# Patient Record
(demographics unavailable — no encounter records)

---

## 2025-04-14 NOTE — HISTORY OF PRESENT ILLNESS
[FreeTextEntry1] : here for annual menopausal symptoms have worsened somewhat, but patient is afraid of oral hrt due to her strong family hx of cancer. Also, she is having discomfort during intercourse, but she would consider vaginal estrogen

## 2025-04-14 NOTE — PHYSICAL EXAM
[Chaperoned Physical Exam] : A chaperone was present in the examining room during all aspects of the physical examination. [MA] : MA [Vulvar Atrophy] : vulvar atrophy [Labia Majora] : normal [Labia Minora] : normal [Atrophy] : atrophy [Normal] : normal [Uterine Adnexae] : normal [FreeTextEntry2] : valentina